# Patient Record
(demographics unavailable — no encounter records)

---

## 2024-11-20 NOTE — REVIEW OF SYSTEMS
[Hearing Loss] : hearing loss [Incontinence] : incontinence [Unsteady Walking] : ataxia [Easy Bruising] : easy bruising [Negative] : Psychiatric [Nocturia] : no nocturia [Dizziness] : no dizziness [Confusion] : no confusion [Memory Loss] : no memory loss [FreeTextEntry9] : unsteady while walking

## 2024-11-20 NOTE — REASON FOR VISIT
[Follow-Up] : a follow-up visit [Formal Caregiver] : formal caregiver [Pre-Visit Preparation] : pre-visit preparation was done [Intercurrent Specialty/Sub-specialty Visits] : the patient has no intercurrent specialty/sub-specialty visits [FreeTextEntry1] : for HTN, rapid Afib, RA, basal cell cancer, hypothyroidism, sciatica, and arthritis of b/l knees

## 2024-11-20 NOTE — HISTORY OF PRESENT ILLNESS
[Live-in] : live-in [Patient] : patient [FreeTextEntry2] : PMH: HTN, rapid Afib, RA, basal cell cancer, hypothyroidism, sciatica, and arthritis of b/l knees (s/p knee arthroplasty), remote DVT seen today for an initial visit for chronic conditions.   A&Ox4 went to dermatology for keratosis of face- had multiple biopsies and scrapings of R temple area, R cheek, top of head and bilateral legs- "everything ok" per pt; NP daughter out of town to verify Otherwise pt in usual state of health but reports hearing decreased and has water in her ears  - Ambulating through house fast with walker- no recent falls, still doing her  light exercises holding kitchen counter - Continent of bowel, incontinent of bladder, using sanitary pads - Appetite remains good, eats high carb diet, likes to snack on pretzels throughout the day - Sleeping well - Denies fever, chills, chest pain, SOB, n/v/d/c  Anemia: on folic acid; H&H 11.6/35.9 in 2/24 HTN: BP controlled with metoprolol 12.5mg BID, HCTZ 12.5mg daily which she holds if BP <90 Hypothyroid: on levothyroxine 25mg daily; TSH 3.15 in 2/24 RA: on methotrexate 7.5mg weekly CKD: GFR 47, creat 1.08 in 2/2024

## 2024-11-20 NOTE — COUNSELING
[Normal Weight - ( BMI  <25 )] : normal weight - ( BMI  <25 ) [Continue diet as tolerated] : continue diet as tolerated based on goals of care [Non - Smoker] : non-smoker [Use assistive device to avoid falls] : use assistive device to avoid falls [Remove clutter and unsafe carpeting to avoid falls] : remove clutter and unsafe carpeting to avoid falls [___] : [unfilled] [] : foot exam [Maintain functional ability] : maintain functional ability [Discussed disease trajectory with patient/caregiver] : discussed disease trajectory with patient/caregiver [Likely to achieve goals/desired outcomes] : likely to achieve goals/desired outcomes [Patient/Caregiver has ___ understanding of disease process] : patient/caregiver has [unfilled] understanding of disease process [Completed Healthcare Proxy] : completed healthcare proxy [Completed DNR] : completed DNR [Completed Medical Orders for Life-Sustaining Treatment] : completed medical orders for life-sustaining treatment [DNR] : Code Status: DNR [Limited] : Treatment Guidelines: Limited [DNI] : Intubation: DNI [[facility]: _____] [facility] [unfilled] [_____] : HCP: [unfilled] [FreeTextEntry4] : Stay healthy.

## 2024-11-20 NOTE — HEALTH RISK ASSESSMENT
[HRA Reviewed] : Health risk assessment reviewed [Independent] : using telephone [Some assistance needed] : managing medications [Full assistance needed] : managing finances [No falls in past year] : Patient reported no falls in the past year [Yes] : The patient does have visual impairment [TimeGetUpGo] : 20

## 2024-11-20 NOTE — PHYSICAL EXAM
[No Acute Distress] : no acute distress [Normal Voice/Communication] : normal voice communication [Normal Sclera/Conjunctiva] : normal sclera/conjunctiva [PERRL] : pupils equal, round and reactive to light [Normal Outer Ear/Nose] : the ears and nose were normal in appearance [Normal TMs] : both tympanic membranes were normal [No JVD] : no jugular venous distention [Supple] : the neck was supple [Thyroid Normal, No Nodules] : the thyroid was normal and there were no nodules present [No Respiratory Distress] : no respiratory distress [Clear to Auscultation] : lungs were clear to auscultation bilaterally [No Accessory Muscle Use] : no accessory muscle use [Normal Rate] : heart rate was normal  [Normal S1, S2] : normal S1 and S2 [No Edema] : there was no peripheral edema [Breast Exam Declined] : patient declined to have breast exam done [Normal Bowel Sounds] : normal bowel sounds [Non Tender] : non-tender [Soft] : abdomen soft [Not Distended] : not distended [Patient Refused] : rectal exam was refused by the patient [No CVA Tenderness] : no ~M costovertebral angle tenderness [No Spinal Tenderness] : no spinal tenderness [No Rash] : no rash [Cranial Nerves Intact] : cranial nerves 2-12 were intact [No Gross Sensory Deficits] : no gross sensory deficits [Normal Reflexes] : deep tendon reflexes were 2+ and symmetric [Oriented x3] : oriented to person, place, and time [Normal Affect] : the affect was normal [Normal Mood] : the mood was normal [Kyphosis] : no kyphosis present [de-identified] : murmur [de-identified] : using walker, unstable gait; rheumatoid deformities of hands [de-identified] : keratosis, spider veins on bilateral LE

## 2024-11-20 NOTE — CHRONIC CARE ASSESSMENT
[Other: ____] : [unfilled] [Can not Exercise (Disability)] : Exercise: The patient can not exercise due to disability [Low Salt Diet] : low salt [General Adherence] : and is generally adherent

## 2025-03-07 NOTE — REVIEW OF SYSTEMS
[Hearing Loss] : hearing loss [Incontinence] : incontinence [Unsteady Walking] : ataxia [Easy Bruising] : easy bruising [Negative] : Psychiatric [Nocturia] : no nocturia [Dizziness] : no dizziness [Confusion] : no confusion [Memory Loss] : no memory loss [FreeTextEntry9] : unsteady while walking  [de-identified] : as noted in HPI

## 2025-03-07 NOTE — HISTORY OF PRESENT ILLNESS
[Live-in] : live-in [Patient] : patient [FreeTextEntry2] : PMH: HTN, rapid Afib, RA, basal cell cancer, hypothyroidism, sciatica, and arthritis of b/l knees (s/p knee arthroplasty), remote DVT seen today for an initial visit for chronic conditions.   A&Ox4 with no complaints except for "my age is catching up to me" - has keratosis top of head "in 2 spots and they are ugly" - still ambulating through house with rolling walker- no recent falls,  - Continent of bowel, incontinent of bladder, using sanitary pads - Appetite good, eats high carb diet, likes to snack on pretzels throughout the day - Sleeping well - Denies fever, chills, chest pain, SOB, n/v/d/c  Anemia: on folic acid; H&H 9.4/29.4 in 11/24 HTN: BP controlled with metoprolol 12.5mg BID, HCTZ 12.5mg daily which she holds if BP <90 Hypothyroid: on levothyroxine 25mg daily; TSH 2.62 in 11/24 RA: on methotrexate 7.5mg weekly CKD: GFR 53, creat 0.97 in 11/24

## 2025-03-07 NOTE — COUNSELING
[Normal Weight - ( BMI  <25 )] : normal weight - ( BMI  <25 ) [Continue diet as tolerated] : continue diet as tolerated based on goals of care [Non - Smoker] : non-smoker [Use assistive device to avoid falls] : use assistive device to avoid falls [Remove clutter and unsafe carpeting to avoid falls] : remove clutter and unsafe carpeting to avoid falls [___] : [unfilled] [] : foot exam [Maintain functional ability] : maintain functional ability [Discussed disease trajectory with patient/caregiver] : discussed disease trajectory with patient/caregiver [Likely to achieve goals/desired outcomes] : likely to achieve goals/desired outcomes [Patient/Caregiver has ___ understanding of disease process] : patient/caregiver has [unfilled] understanding of disease process [Completed Healthcare Proxy] : completed healthcare proxy [Completed DNR] : completed DNR [Completed Medical Orders for Life-Sustaining Treatment] : completed medical orders for life-sustaining treatment [DNR] : Code Status: DNR [Limited] : Treatment Guidelines: Limited [DNI] : Intubation: DNI [[facility]: _____] [facility]last verification date: [unfilled] [_____] : HCP: [unfilled] [FreeTextEntry4] : Stay healthy.

## 2025-03-07 NOTE — COUNSELING
[Normal Weight - ( BMI  <25 )] : normal weight - ( BMI  <25 ) [Continue diet as tolerated] : continue diet as tolerated based on goals of care [Non - Smoker] : non-smoker [Use assistive device to avoid falls] : use assistive device to avoid falls [Remove clutter and unsafe carpeting to avoid falls] : remove clutter and unsafe carpeting to avoid falls [___] : [unfilled] [] : foot exam [Maintain functional ability] : maintain functional ability [Discussed disease trajectory with patient/caregiver] : discussed disease trajectory with patient/caregiver [Likely to achieve goals/desired outcomes] : likely to achieve goals/desired outcomes [Patient/Caregiver has ___ understanding of disease process] : patient/caregiver has [unfilled] understanding of disease process [Completed Healthcare Proxy] : completed healthcare proxy [Completed DNR] : completed DNR [Completed Medical Orders for Life-Sustaining Treatment] : completed medical orders for life-sustaining treatment [DNR] : Code Status: DNR [Limited] : Treatment Guidelines: Limited [DNI] : Intubation: DNI [[facility] Date: _____[facility]/last verification date: [unfilled] [_____] : HCP: [unfilled] [FreeTextEntry4] : Stay healthy.

## 2025-03-07 NOTE — PHYSICAL EXAM
[No Acute Distress] : no acute distress [Normal Voice/Communication] : normal voice communication [Normal Sclera/Conjunctiva] : normal sclera/conjunctiva [PERRL] : pupils equal, round and reactive to light [Normal Outer Ear/Nose] : the ears and nose were normal in appearance [Normal TMs] : both tympanic membranes were normal [No JVD] : no jugular venous distention [Supple] : the neck was supple [Thyroid Normal, No Nodules] : the thyroid was normal and there were no nodules present [No Respiratory Distress] : no respiratory distress [Clear to Auscultation] : lungs were clear to auscultation bilaterally [No Accessory Muscle Use] : no accessory muscle use [Normal Rate] : heart rate was normal  [Normal S1, S2] : normal S1 and S2 [No Edema] : there was no peripheral edema [Breast Exam Declined] : patient declined to have breast exam done [Normal Bowel Sounds] : normal bowel sounds [Non Tender] : non-tender [Soft] : abdomen soft [Not Distended] : not distended [Patient Refused] : rectal exam was refused by the patient [No CVA Tenderness] : no ~M costovertebral angle tenderness [No Spinal Tenderness] : no spinal tenderness [No Rash] : no rash [Cranial Nerves Intact] : cranial nerves 2-12 were intact [No Gross Sensory Deficits] : no gross sensory deficits [Normal Reflexes] : deep tendon reflexes were 2+ and symmetric [Oriented x3] : oriented to person, place, and time [Normal Affect] : the affect was normal [Normal Mood] : the mood was normal [Kyphosis] : no kyphosis present [de-identified] : murmur [de-identified] : using walker, unstable gait; rheumatoid deformities of hands [de-identified] : keratosis, spider veins on bilateral LE' 2 quarter size keratosis top of head

## 2025-03-07 NOTE — REVIEW OF SYSTEMS
[Hearing Loss] : hearing loss [Incontinence] : incontinence [Unsteady Walking] : ataxia [Easy Bruising] : easy bruising [Negative] : Psychiatric [Nocturia] : no nocturia [Dizziness] : no dizziness [Confusion] : no confusion [Memory Loss] : no memory loss [FreeTextEntry9] : unsteady while walking  [de-identified] : as noted in HPI

## 2025-03-07 NOTE — PHYSICAL EXAM
[No Acute Distress] : no acute distress [Normal Voice/Communication] : normal voice communication [Normal Sclera/Conjunctiva] : normal sclera/conjunctiva [PERRL] : pupils equal, round and reactive to light [Normal Outer Ear/Nose] : the ears and nose were normal in appearance [Normal TMs] : both tympanic membranes were normal [No JVD] : no jugular venous distention [Supple] : the neck was supple [Thyroid Normal, No Nodules] : the thyroid was normal and there were no nodules present [No Respiratory Distress] : no respiratory distress [Clear to Auscultation] : lungs were clear to auscultation bilaterally [No Accessory Muscle Use] : no accessory muscle use [Normal Rate] : heart rate was normal  [Normal S1, S2] : normal S1 and S2 [No Edema] : there was no peripheral edema [Breast Exam Declined] : patient declined to have breast exam done [Normal Bowel Sounds] : normal bowel sounds [Non Tender] : non-tender [Soft] : abdomen soft [Not Distended] : not distended [Patient Refused] : rectal exam was refused by the patient [No CVA Tenderness] : no ~M costovertebral angle tenderness [No Spinal Tenderness] : no spinal tenderness [No Rash] : no rash [Cranial Nerves Intact] : cranial nerves 2-12 were intact [No Gross Sensory Deficits] : no gross sensory deficits [Normal Reflexes] : deep tendon reflexes were 2+ and symmetric [Oriented x3] : oriented to person, place, and time [Normal Affect] : the affect was normal [Normal Mood] : the mood was normal [Kyphosis] : no kyphosis present [de-identified] : murmur [de-identified] : using walker, unstable gait; rheumatoid deformities of hands [de-identified] : keratosis, spider veins on bilateral LE' 2 quarter size keratosis top of head